# Patient Record
Sex: MALE | Race: ASIAN | Employment: STUDENT | URBAN - METROPOLITAN AREA
[De-identification: names, ages, dates, MRNs, and addresses within clinical notes are randomized per-mention and may not be internally consistent; named-entity substitution may affect disease eponyms.]

---
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
## 2020-01-31 ENCOUNTER — APPOINTMENT (OUTPATIENT)
Dept: CT IMAGING | Age: 20
End: 2020-01-31
Attending: FAMILY MEDICINE
Payer: COMMERCIAL

## 2020-01-31 ENCOUNTER — HOSPITAL ENCOUNTER (OUTPATIENT)
Age: 20
Discharge: HOME | End: 2020-01-31
Attending: FAMILY MEDICINE
Payer: COMMERCIAL

## 2020-01-31 VITALS
HEIGHT: 71 IN | BODY MASS INDEX: 17.5 KG/M2 | SYSTOLIC BLOOD PRESSURE: 99 MMHG | DIASTOLIC BLOOD PRESSURE: 58 MMHG | OXYGEN SATURATION: 98 % | WEIGHT: 125 LBS | HEART RATE: 80 BPM | TEMPERATURE: 98 F | RESPIRATION RATE: 16 BRPM

## 2020-01-31 DIAGNOSIS — R10.9 ABDOMINAL PAIN, ACUTE: Primary | ICD-10-CM

## 2020-01-31 DIAGNOSIS — D72.829 LEUKOCYTOSIS, UNSPECIFIED TYPE: ICD-10-CM

## 2020-01-31 LAB
#MXD IC: 1.1 X10ˆ3/UL (ref 0.1–1)
ALBUMIN SERPL-MCNC: 4.5 G/DL (ref 3.4–5)
ALBUMIN/GLOB SERPL: 1 {RATIO} (ref 1–2)
ALP LIVER SERPL-CCNC: 100 U/L (ref 45–117)
ALT SERPL-CCNC: 35 U/L (ref 16–61)
ANION GAP SERPL CALC-SCNC: 6 MMOL/L (ref 0–18)
AST SERPL-CCNC: 87 U/L (ref 15–37)
BILIRUB SERPL-MCNC: 0.7 MG/DL (ref 0.1–2)
BUN BLD-MCNC: 14 MG/DL (ref 7–18)
BUN/CREAT SERPL: 15.2 (ref 10–20)
CALCIUM BLD-MCNC: 9.3 MG/DL (ref 8.5–10.1)
CHLORIDE SERPL-SCNC: 105 MMOL/L (ref 98–112)
CO2 SERPL-SCNC: 28 MMOL/L (ref 21–32)
CREAT BLD-MCNC: 0.7 MG/DL (ref 0.7–1.3)
CREAT BLD-MCNC: 0.92 MG/DL (ref 0.7–1.3)
GLOBULIN PLAS-MCNC: 4.3 G/DL (ref 2.8–4.4)
GLUCOSE BLD-MCNC: 108 MG/DL (ref 70–99)
HCT VFR BLD AUTO: 45.1 % (ref 39–53)
HGB BLD-MCNC: 15.5 G/DL (ref 13–17.5)
LYMPHOCYTES # BLD AUTO: 1.8 X10ˆ3/UL (ref 1.5–5)
LYMPHOCYTES NFR BLD AUTO: 12.3 %
M PROTEIN MFR SERPL ELPH: 8.8 G/DL (ref 6.4–8.2)
MCH RBC QN AUTO: 29.8 PG (ref 26–34)
MCHC RBC AUTO-ENTMCNC: 34.4 G/DL (ref 31–37)
MCV RBC AUTO: 86.7 FL (ref 80–100)
MIXED CELL %: 7.4 %
NEUTROPHILS # BLD AUTO: 12.1 X10ˆ3/UL (ref 1.5–7.7)
NEUTROPHILS NFR BLD AUTO: 80.3 %
OSMOLALITY SERPL CALC.SUM OF ELEC: 289 MOSM/KG (ref 275–295)
PATIENT FASTING Y/N/NP: NO
PLATELET # BLD AUTO: 258 X10ˆ3/UL (ref 150–450)
POCT BILIRUBIN URINE: NEGATIVE
POCT GLUCOSE URINE: NEGATIVE MG/DL
POCT KETONE URINE: 15 MG/DL
POCT LEUKOCYTE ESTERASE URINE: NEGATIVE
POCT NITRITE URINE: NEGATIVE
POCT PH URINE: 6 (ref 5–8)
POCT PROTEIN URINE: NEGATIVE MG/DL
POCT SPECIFIC GRAVITY URINE: 1.03
POCT URINE CLARITY: CLEAR
POCT URINE COLOR: YELLOW
POCT UROBILINOGEN URINE: 0.2 MG/DL
POTASSIUM SERPL-SCNC: 4.1 MMOL/L (ref 3.5–5.1)
RBC # BLD AUTO: 5.2 X10ˆ6/UL (ref 4.3–5.7)
SODIUM SERPL-SCNC: 139 MMOL/L (ref 136–145)
WBC # BLD AUTO: 15 X10ˆ3/UL (ref 4–11)

## 2020-01-31 PROCEDURE — 85025 COMPLETE CBC W/AUTO DIFF WBC: CPT | Performed by: FAMILY MEDICINE

## 2020-01-31 PROCEDURE — 99214 OFFICE O/P EST MOD 30 MIN: CPT

## 2020-01-31 PROCEDURE — 81002 URINALYSIS NONAUTO W/O SCOPE: CPT | Performed by: FAMILY MEDICINE

## 2020-01-31 PROCEDURE — 96360 HYDRATION IV INFUSION INIT: CPT

## 2020-01-31 PROCEDURE — 80053 COMPREHEN METABOLIC PANEL: CPT | Performed by: FAMILY MEDICINE

## 2020-01-31 PROCEDURE — 82565 ASSAY OF CREATININE: CPT

## 2020-01-31 PROCEDURE — 74177 CT ABD & PELVIS W/CONTRAST: CPT | Performed by: FAMILY MEDICINE

## 2020-01-31 PROCEDURE — 83690 ASSAY OF LIPASE: CPT | Performed by: FAMILY MEDICINE

## 2020-01-31 RX ORDER — ONDANSETRON 4 MG/1
4 TABLET, ORALLY DISINTEGRATING ORAL EVERY 4 HOURS PRN
Qty: 10 TABLET | Refills: 0 | Status: SHIPPED | OUTPATIENT
Start: 2020-01-31 | End: 2020-02-07

## 2020-01-31 RX ORDER — SODIUM CHLORIDE 9 MG/ML
1000 INJECTION, SOLUTION INTRAVENOUS ONCE
Status: COMPLETED | OUTPATIENT
Start: 2020-01-31 | End: 2020-01-31

## 2020-01-31 NOTE — ED INITIAL ASSESSMENT (HPI)
Pt with L upper abd pain for a few months, diagnosed with pancreatitis in August; pain worsening since last night; pt with constipation for several months, pt vomited 1 time an hour and a half ago when trying to have a BM; no fever    Mom states she had to

## 2020-02-01 LAB — LIPASE SERPL-CCNC: 179 U/L (ref 73–393)

## 2020-02-01 NOTE — ED NOTES
Final result received today:  CMP  Medication:   zofran  Notes: dx abd, pain /leukocytosis. discharged home.  Sent to dr. Max Nagy for review

## 2020-02-01 NOTE — ED PROVIDER NOTES
Patient Seen in: [facility]      History   Patient presents with:  Abdomen/Flank Pain  Constipation  Nausea/Vomiting/Diarrhea    Stated Complaint: nausea, vomiting, stomach pain and diarrhea 2 days    HPI    55-year-old male wit rest of the abdomen soft nontender no guarding no rigidity.     ED Course     Labs Reviewed   POCT URINALYSIS DIPSTICK - Abnormal; Notable for the following components:       Result Value    Ketone, Urine 15  (*)     Blood, Urine Trace-Intact (*)     All ot calcification. ADRENALS:  No mass or enlargement. AORTA/VASCULAR:  No aneurysm. RETROPERITONEUM:  No mass or adenopathy. BOWEL/MESENTERY:  No visible mass, obstruction, or bowel wall thickening. ABDOMINAL WALL:  No mass or hernia.   URINARY BLADDER:  N Dispersible  Take 1 tablet (4 mg total) by mouth every 4 (four) hours as needed for Nausea., Normal, Disp-10 tablet, R-0

## 2020-02-04 NOTE — ED NOTES
Phone call placed to pt's phone, message left on voice mail asking pt to please return our call. Return phone number provided.   (Comp M)

## 2020-02-06 NOTE — ED NOTES
Patient was informed of CMP results. He verbalizes that he will follow up w/ PCP for recheck of CMP. Denies any questions or concerns.

## 2021-05-12 ENCOUNTER — HOSPITAL ENCOUNTER (OUTPATIENT)
Age: 21
Discharge: HOME | End: 2021-05-12
Attending: EMERGENCY MEDICINE
Payer: COMMERCIAL

## 2021-05-12 ENCOUNTER — APPOINTMENT (OUTPATIENT)
Dept: GENERAL RADIOLOGY | Age: 21
End: 2021-05-12
Attending: PHYSICIAN ASSISTANT
Payer: COMMERCIAL

## 2021-05-12 VITALS
SYSTOLIC BLOOD PRESSURE: 108 MMHG | BODY MASS INDEX: 17.61 KG/M2 | HEART RATE: 87 BPM | HEIGHT: 72 IN | OXYGEN SATURATION: 98 % | WEIGHT: 130 LBS | RESPIRATION RATE: 20 BRPM | DIASTOLIC BLOOD PRESSURE: 64 MMHG | TEMPERATURE: 98 F

## 2021-05-12 DIAGNOSIS — S63.641A SPRAIN OF METACARPOPHALANGEAL (MCP) JOINT OF RIGHT THUMB, INITIAL ENCOUNTER: Primary | ICD-10-CM

## 2021-05-12 PROCEDURE — 73140 X-RAY EXAM OF FINGER(S): CPT | Performed by: PHYSICIAN ASSISTANT

## 2021-05-12 PROCEDURE — 99213 OFFICE O/P EST LOW 20 MIN: CPT

## 2021-05-12 PROCEDURE — 29130 APPL FINGER SPLINT STATIC: CPT

## 2021-05-12 NOTE — ED PROVIDER NOTES
Patient Seen in: [facility]      History   Patient presents with:  Arm or Hand Injury    Stated Complaint: thumb pain,right hand,injury    HPI/Subjective:   HPI    Patient is a 27-year-old male. Right-hand-dominant.   5 days prior to arri 2 VIEWS), RIGHT THUMB (CPT=73140)    Result Date: 5/12/2021  PROCEDURE:  XR FINGER(S) (MIN 2 VIEWS), RIGHT THUMB (CPT=73140)  INDICATIONS:  thumb pain,right hand,injury  COMPARISON:  None. TECHNIQUE:  Three views of the finger were obtained.   PATIENT STAT

## 2022-04-08 ENCOUNTER — OFFICE VISIT (OUTPATIENT)
Dept: FAMILY MEDICINE CLINIC | Facility: CLINIC | Age: 22
End: 2022-04-08
Payer: COMMERCIAL

## 2022-04-08 VITALS — OXYGEN SATURATION: 98 % | TEMPERATURE: 99 F | HEART RATE: 79 BPM

## 2022-04-08 DIAGNOSIS — J01.00 ACUTE NON-RECURRENT MAXILLARY SINUSITIS: Primary | ICD-10-CM

## 2022-04-08 DIAGNOSIS — J40 BRONCHITIS: ICD-10-CM

## 2022-04-08 PROCEDURE — 99213 OFFICE O/P EST LOW 20 MIN: CPT | Performed by: FAMILY MEDICINE

## 2022-04-08 RX ORDER — ALBUTEROL SULFATE 90 UG/1
2 AEROSOL, METERED RESPIRATORY (INHALATION) EVERY 4 HOURS PRN
Qty: 18 G | Refills: 0 | Status: SHIPPED | OUTPATIENT
Start: 2022-04-08

## 2022-04-08 RX ORDER — AMOXICILLIN AND CLAVULANATE POTASSIUM 875; 125 MG/1; MG/1
1 TABLET, FILM COATED ORAL 2 TIMES DAILY
Qty: 20 TABLET | Refills: 0 | Status: SHIPPED | OUTPATIENT
Start: 2022-04-08 | End: 2022-04-18

## 2022-04-08 RX ORDER — PREDNISONE 20 MG/1
TABLET ORAL
Qty: 10 TABLET | Refills: 0 | Status: SHIPPED | OUTPATIENT
Start: 2022-04-08

## 2022-04-08 NOTE — PATIENT INSTRUCTIONS
Take antibiotics with food and plenty of water. Eat yogurt or take probiotic daily. (Yojana Lorenz is a good example of an OTC probiotic)  Make sure to finish the entire antibiotic treatment. Increase fluids and rest.     Take prednisone-- 2 tabs once daily for 5 days. Take with food. While you are on steroids it is preferred that you take Tylenol for pain if needed rather than ibuprofen (Motrin/Ibuprofen) or Aleve. Use inhaler as needed-- 2 puffs every 4-6 hours for coughing or wheezing. Use OTC meds for comfort as needed--  Ibuprofen/Tylenol for fever/pain  Use Benadryl at bedtime to reduce drainage and promote rest.  Zyrtec/Claritin/Allegra in the AM to reduce nasal drainage without sedation. Use saline nasal sprays to reduce congestion and thin secretions. Consider adding flonase for congestion relief and to reduce sinus infection symptoms. Use Delsym for cough. Consider applying julita's vapo-rub or eucayptus oil to chest and feet at bedtime to reduce chest and nasal congestion. Warm tea with honey, cough lozenges, vaporizers/steam etc.    Monitor symptoms and contact the office if no better in 2-3 days.

## 2022-04-11 ENCOUNTER — TELEPHONE (OUTPATIENT)
Dept: FAMILY MEDICINE CLINIC | Facility: CLINIC | Age: 22
End: 2022-04-11

## 2022-04-11 RX ORDER — ALBUTEROL SULFATE 90 UG/1
2 AEROSOL, METERED RESPIRATORY (INHALATION) EVERY 6 HOURS PRN
Qty: 1 EACH | Refills: 0 | Status: SHIPPED | OUTPATIENT
Start: 2022-04-11

## 2022-04-11 NOTE — TELEPHONE ENCOUNTER
Pt and mom state inhaler, at $35.00 was to expensive and pt can't take prednisone because he didn't like the way it felt. Printed new script mom will take it to son's campus to have it filled.

## 2022-07-27 NOTE — CONSULTS
Gastroenterology Initial Consultation    Landen Martinez Patient Status:  Observation    2000 MRN [id number]   [facility] 3NE-A Attending Bayron Crespo MD   Hosp Day # 0 PCP No primary care provider on file.        Reason for [facility] disturbance. Neurological: Denies frequent headaches, recent passing out, recent dizziness, convulsions/seizures.   Cardiovascular: Denies history of heart murmur, leg swelling, chest pain or pressure after eating or when upset, angina, irregular heart rat abdominal skin  Psychiatric: Normal judgement and insight. Normal mood and affect. Data:  Relevant labs, imaging and medications reviewed.      Lab Results   Component Value Date    WBC 8.9 07/14/2019    HGB 12.8 07/14/2019    HCT 37.1 07/14/2019    PLT Humira Counseling:  I discussed with the patient the risks of adalimumab including but not limited to myelosuppression, immunosuppression, autoimmune hepatitis, demyelinating diseases, lymphoma, and serious infections.  The patient understands that monitoring is required including a PPD at baseline and must alert us or the primary physician if symptoms of infection or other concerning signs are noted.

## 2022-08-11 ENCOUNTER — OFFICE VISIT (OUTPATIENT)
Dept: FAMILY MEDICINE CLINIC | Facility: CLINIC | Age: 22
End: 2022-08-11
Payer: COMMERCIAL

## 2022-08-11 VITALS
BODY MASS INDEX: 17.5 KG/M2 | DIASTOLIC BLOOD PRESSURE: 62 MMHG | SYSTOLIC BLOOD PRESSURE: 102 MMHG | HEIGHT: 70.9 IN | TEMPERATURE: 97 F | HEART RATE: 82 BPM | WEIGHT: 125 LBS | OXYGEN SATURATION: 98 % | RESPIRATION RATE: 16 BRPM

## 2022-08-11 DIAGNOSIS — Z00.00 ENCOUNTER FOR ANNUAL PHYSICAL EXAM: Primary | ICD-10-CM

## 2022-08-11 DIAGNOSIS — Z11.1 SCREENING FOR TUBERCULOSIS: ICD-10-CM

## 2022-08-11 LAB — INDURATION (): 0 MM (ref 0–11)

## 2022-08-11 PROCEDURE — 99385 PREV VISIT NEW AGE 18-39: CPT | Performed by: FAMILY MEDICINE

## 2022-08-11 PROCEDURE — 86580 TB INTRADERMAL TEST: CPT | Performed by: FAMILY MEDICINE

## 2022-08-11 PROCEDURE — 3074F SYST BP LT 130 MM HG: CPT | Performed by: FAMILY MEDICINE

## 2022-08-11 PROCEDURE — 3008F BODY MASS INDEX DOCD: CPT | Performed by: FAMILY MEDICINE

## 2022-08-11 PROCEDURE — 3078F DIAST BP <80 MM HG: CPT | Performed by: FAMILY MEDICINE

## 2022-08-13 ENCOUNTER — MED REC SCAN ONLY (OUTPATIENT)
Dept: FAMILY MEDICINE CLINIC | Facility: CLINIC | Age: 22
End: 2022-08-13

## 2022-08-25 ENCOUNTER — TELEPHONE (OUTPATIENT)
Dept: FAMILY MEDICINE CLINIC | Facility: CLINIC | Age: 22
End: 2022-08-25

## 2022-08-25 NOTE — TELEPHONE ENCOUNTER
Patients mom calling stating that form for work that was completed at [address], is missing 2 check marks. Placing on PCP desk. Mom asking if we can upload in [employer] message to patient. She will try to find fax # in mean time.      Dr. Ketty Durand please advise

## 2022-08-26 NOTE — TELEPHONE ENCOUNTER
LVM for mom, form in [address] folder. Cannot send in [place], can only fax or mom/pt can .      Sent copt to scanning

## 2022-11-27 ENCOUNTER — OFFICE VISIT (OUTPATIENT)
Dept: FAMILY MEDICINE CLINIC | Facility: CLINIC | Age: 22
End: 2022-11-27
Payer: COMMERCIAL

## 2022-11-27 VITALS
HEIGHT: 71 IN | BODY MASS INDEX: 18.2 KG/M2 | RESPIRATION RATE: 18 BRPM | OXYGEN SATURATION: 97 % | SYSTOLIC BLOOD PRESSURE: 101 MMHG | HEART RATE: 106 BPM | TEMPERATURE: 100 F | DIASTOLIC BLOOD PRESSURE: 65 MMHG | WEIGHT: 130 LBS

## 2022-11-27 DIAGNOSIS — J02.9 SORE THROAT: ICD-10-CM

## 2022-11-27 DIAGNOSIS — J06.9 VIRAL UPPER RESPIRATORY ILLNESS: ICD-10-CM

## 2022-11-27 DIAGNOSIS — Z20.818 EXPOSURE TO STREP THROAT: Primary | ICD-10-CM

## 2022-11-27 LAB
CONTROL LINE PRESENT WITH A CLEAR BACKGROUND (YES/NO): YES YES/NO
KIT LOT #: NORMAL NUMERIC
STREP GRP A CUL-SCR: NEGATIVE

## 2022-11-27 PROCEDURE — 3074F SYST BP LT 130 MM HG: CPT | Performed by: NURSE PRACTITIONER

## 2022-11-27 PROCEDURE — 87880 STREP A ASSAY W/OPTIC: CPT | Performed by: NURSE PRACTITIONER

## 2022-11-27 PROCEDURE — 87081 CULTURE SCREEN ONLY: CPT | Performed by: NURSE PRACTITIONER

## 2022-11-27 PROCEDURE — 99213 OFFICE O/P EST LOW 20 MIN: CPT | Performed by: NURSE PRACTITIONER

## 2022-11-27 PROCEDURE — 3008F BODY MASS INDEX DOCD: CPT | Performed by: NURSE PRACTITIONER

## 2022-11-27 PROCEDURE — 3078F DIAST BP <80 MM HG: CPT | Performed by: NURSE PRACTITIONER

## 2024-04-18 ENCOUNTER — APPOINTMENT (OUTPATIENT)
Dept: CT IMAGING | Age: 24
End: 2024-04-18
Attending: EMERGENCY MEDICINE
Payer: COMMERCIAL

## 2024-04-18 ENCOUNTER — HOSPITAL ENCOUNTER (OUTPATIENT)
Age: 24
Discharge: HOME | End: 2024-04-18
Attending: EMERGENCY MEDICINE
Payer: COMMERCIAL

## 2024-04-18 VITALS
WEIGHT: 135 LBS | HEART RATE: 91 BPM | OXYGEN SATURATION: 96 % | TEMPERATURE: 97 F | RESPIRATION RATE: 18 BRPM | HEIGHT: 72 IN | BODY MASS INDEX: 18.28 KG/M2 | SYSTOLIC BLOOD PRESSURE: 106 MMHG | DIASTOLIC BLOOD PRESSURE: 67 MMHG

## 2024-04-18 DIAGNOSIS — J32.3 SPHENOID SINUSITIS, UNSPECIFIED CHRONICITY: ICD-10-CM

## 2024-04-18 DIAGNOSIS — R51.9 ACUTE NONINTRACTABLE HEADACHE, UNSPECIFIED HEADACHE TYPE: Primary | ICD-10-CM

## 2024-04-18 PROCEDURE — 99214 OFFICE O/P EST MOD 30 MIN: CPT

## 2024-04-18 PROCEDURE — 70450 CT HEAD/BRAIN W/O DYE: CPT | Performed by: EMERGENCY MEDICINE

## 2024-04-18 RX ORDER — AMOXICILLIN 500 MG/1
500 TABLET, FILM COATED ORAL 3 TIMES DAILY
Qty: 21 TABLET | Refills: 0 | Status: SHIPPED | OUTPATIENT
Start: 2024-04-18 | End: 2024-04-18

## 2024-04-18 RX ORDER — AMOXICILLIN 500 MG/1
500 TABLET, FILM COATED ORAL 3 TIMES DAILY
Qty: 21 TABLET | Refills: 0 | Status: SHIPPED | OUTPATIENT
Start: 2024-04-18 | End: 2024-04-25

## 2024-04-18 RX ORDER — BUTALBITAL, ACETAMINOPHEN AND CAFFEINE 50; 325; 40 MG/1; MG/1; MG/1
1-2 TABLET ORAL EVERY 6 HOURS PRN
Qty: 10 TABLET | Refills: 0 | Status: SHIPPED | OUTPATIENT
Start: 2024-04-18 | End: 2024-04-23

## 2024-04-18 RX ORDER — BUTALBITAL, ACETAMINOPHEN AND CAFFEINE 50; 325; 40 MG/1; MG/1; MG/1
1-2 TABLET ORAL EVERY 6 HOURS PRN
Qty: 10 TABLET | Refills: 0 | Status: SHIPPED | OUTPATIENT
Start: 2024-04-18 | End: 2024-04-18

## 2024-04-18 NOTE — ED PROVIDER NOTES
Patient Seen in: [facility]      History     Chief Complaint   Patient presents with    Headache     Stated Complaint: Headache    Subjective:   HPI    23-year-old male with history of migraines and pancreatitis presents to the immediate care for complaints of a headache.  He states that he had a headache for the last 2 to 3 days.  He states that it is worse when he first wakes up.  He feels as though he has some left-sided sinus pressure.  He denies any visual scotomas.  Denies any light sensitivity or nausea.  He states that he has a history of migraine headaches but this feels different than that.  He has no complaints of any neck pain.  Denies any facial droop or slurred speech.  Denies any focal motor weakness.    Objective:   Past Medical History:    Migraines    Pancreatitis (HCC)              History reviewed. No pertinent surgical history.             Social History     Socioeconomic History    Marital status: Single   Tobacco Use    Smoking status: Never    Smokeless tobacco: Never   Vaping Use    Vaping status: Former   Substance and Sexual Activity    Alcohol use: Not Currently    Drug use: Yes     Types: Cannabis   Social History Narrative    ** Merged History Encounter **                   Review of Systems    Positive for stated complaint: Headache  Other systems are as noted in HPI.  Constitutional and vital signs reviewed.      All other systems reviewed and negative except as noted above.    Physical Exam     ED Triage Vitals [04/18/24 1827]   /67   Pulse 91   Resp 18   Temp 96.6 °F (35.9 °C)   Temp src Temporal   SpO2 96 %   O2 Device None (Room air)       Current:/67   Pulse 91   Temp 96.6 °F (35.9 °C) (Temporal)   Resp 18   Ht 182.9 cm (6')   Wt 61.2 kg   SpO2 96%   BMI 18.31 kg/m²         Physical Exam    General: Alert and oriented. No acute distress.  HEENT: Normocephalic. No evidence of trauma. Extraocular movements are intact.  Cardiovascular exam:  Regular rate and rhythm  Lungs: Clear to auscultation bilaterally.  Abdomen: Soft, nondistended, nontender.  Extremities: No evidence of deformity. No clubbing or cyanosis.  Neuro: No focal deficit is noted.    ED Course   Labs Reviewed - No data to display  As patient's symptoms today are different than his typical migraines, will obtain a CT scan of the brain since he has had persistent headache for the last several days.  CT scan shows no acute intracranial findings on my independent review of the images.  There does appear to be evidence of sinus disease.  Radiologist interpretation was reviewed:        CT BRAIN OR HEAD ([id number]) (Final result)  Result time 04/18/24 19:05:19  Final result by Shelia Proctor MD (04/18/24 19:05:19)                Impression:    CONCLUSION:  No acute intracranial findings.  Acute on chronic sphenoid sinusitis.              Patient will be discharged home with Fioricet for his headache.  He will be given a prescription for amoxicillin for acute sinus infection.  Recommend follow-up with his primary care doctor.       MDM   Patient was screened and evaluated during this visit.   As a treating physician attending to the patient, I determined, within reasonable clinical confidence and prior to discharge, that an emergency medical condition was not or was no longer present.  There was no indication for further evaluation, treatment or admission on an emergency basis.  Comprehensive verbal and written discharge and follow-up instructions were provided to help prevent relapse or worsening.  Patient was instructed to follow-up with her primary care provider for further evaluation and treatment, but to return immediately to the ER for worsening, concerning, new, changing or persisting symptoms.  I discussed the case with the patient and they had no questions, complaints, or concerns.  Patient felt comfortable going home.    ^^Please note that this report has been produced using speech  recognition software and may contain errors related to that system including, but not limited to, errors in grammar, punctuation, and spelling, as well as words and phrases that possibly may have been recognized inappropriately.  If there are any questions or concerns, contact the dictating provider for clarification                                   Medical Decision Making      Disposition and Plan     Clinical Impression:  1. Acute nonintractable headache, unspecified headache type    2. Sphenoid sinusitis, unspecified chronicity         Disposition:  Discharge  4/18/2024  7:08 pm    Follow-up:  Stephen Mccracken MD  [address] [place] [address]  [phone]    Call   As needed, If symptoms worsen          Medications Prescribed:  Current Discharge Medication List        START taking these medications    Details   butalbital-acetaminophen-caffeine -40 MG Oral Tab Take 1-2 tablets by mouth every 6 (six) hours as needed for Pain.  Qty: 10 tablet, Refills: 0    Associated Diagnoses: Acute nonintractable headache, unspecified headache type

## 2024-04-18 NOTE — ED INITIAL ASSESSMENT (HPI)
Pt began 3 days ago with a headache that gets worse at night and feels like it is coming from his sinus/nose.  Denies any fever , is fatigued, no cough or sore throat at this time

## 2024-04-18 NOTE — ED PROVIDER NOTES
Patient Seen in: [facility]      History     Chief Complaint   Patient presents with    Headache     Stated Complaint: Headache    Subjective:   HPI    23-year-old male with history of migraines and pancreatitis presents to the immediate care for complaints of a headache.  He states that he had a headache for the last 2 to 3 days.  He states that it is worse when he first wakes up.  He feels as though he has some left-sided sinus pressure.  He denies any visual scotomas.  Denies any light sensitivity or nausea.  He states that he has a history of migraine headaches but this feels different than that.  He has no complaints of any neck pain.  Denies any facial droop or slurred speech.  Denies any focal motor weakness.    Objective:   Past Medical History:    Migraines    Pancreatitis (HCC)              History reviewed. No pertinent surgical history.             Social History     Socioeconomic History    Marital status: Single   Tobacco Use    Smoking status: Never    Smokeless tobacco: Never   Vaping Use    Vaping status: Former   Substance and Sexual Activity    Alcohol use: Not Currently    Drug use: Yes     Types: Cannabis   Social History Narrative    ** Merged History Encounter **                   Review of Systems    Positive for stated complaint: Headache  Other systems are as noted in HPI.  Constitutional and vital signs reviewed.      All other systems reviewed and negative except as noted above.    Physical Exam     ED Triage Vitals [04/18/24 1827]   /67   Pulse 91   Resp 18   Temp 96.6 °F (35.9 °C)   Temp src Temporal   SpO2 96 %   O2 Device None (Room air)       Current:/67   Pulse 91   Temp 96.6 °F (35.9 °C) (Temporal)   Resp 18   Ht 182.9 cm (6')   Wt 61.2 kg   SpO2 96%   BMI 18.31 kg/m²         Physical Exam    General: Alert and oriented. No acute distress.  HEENT: Normocephalic. No evidence of trauma. Extraocular movements are intact.  Cardiovascular exam:  Regular rate and rhythm  Lungs: Clear to auscultation bilaterally.  Abdomen: Soft, nondistended, nontender.  Extremities: No evidence of deformity. No clubbing or cyanosis.  Neuro: No focal deficit is noted.    ED Course   Labs Reviewed - No data to display  As patient's symptoms today are different than his typical migraines, will obtain a CT scan of the brain since he has had persistent headache for the last several days.         MDM   Patient was screened and evaluated during this visit.   As a treating physician attending to the patient, I determined, within reasonable clinical confidence and prior to discharge, that an emergency medical condition was not or was no longer present.  There was no indication for further evaluation, treatment or admission on an emergency basis.  Comprehensive verbal and written discharge and follow-up instructions were provided to help prevent relapse or worsening.  Patient was instructed to follow-up with her primary care provider for further evaluation and treatment, but to return immediately to the ER for worsening, concerning, new, changing or persisting symptoms.  I discussed the case with the patient and they had no questions, complaints, or concerns.  Patient felt comfortable going home.    ^^Please note that this report has been produced using speech recognition software and may contain errors related to that system including, but not limited to, errors in grammar, punctuation, and spelling, as well as words and phrases that possibly may have been recognized inappropriately.  If there are any questions or concerns, contact the dictating provider for clarification                                   Medical Decision Making      Disposition and Plan     Clinical Impression:  1. Acute nonintractable headache, unspecified headache type    2. Sphenoid sinusitis, unspecified chronicity         Disposition:  Discharge  4/18/2024  7:08 pm    Follow-up:  Stephen Mccracken MD  [address]  [place]  [address] [place] [id number]  [phone]    Call   As needed, If symptoms worsen          Medications Prescribed:  Current Discharge Medication List        START taking these medications    Details   butalbital-acetaminophen-caffeine -40 MG Oral Tab Take 1-2 tablets by mouth every 6 (six) hours as needed for Pain.  Qty: 10 tablet, Refills: 0    Associated Diagnoses: Acute nonintractable headache, unspecified headache type

## 2024-04-19 NOTE — DISCHARGE INSTRUCTIONS
Follow-up with your primary care doctor as needed  Take Fioricet as needed for headache every 6 hours  Return if any worsening symptoms or any new concerns

## 2024-07-22 ENCOUNTER — HOSPITAL ENCOUNTER (EMERGENCY)
Age: 24
Discharge: HOME | End: 2024-07-22
Attending: EMERGENCY MEDICINE
Payer: COMMERCIAL

## 2024-07-22 ENCOUNTER — APPOINTMENT (OUTPATIENT)
Dept: GENERAL RADIOLOGY | Age: 24
End: 2024-07-22
Attending: EMERGENCY MEDICINE
Payer: COMMERCIAL

## 2024-07-22 VITALS
SYSTOLIC BLOOD PRESSURE: 116 MMHG | TEMPERATURE: 97 F | HEART RATE: 82 BPM | BODY MASS INDEX: 19.6 KG/M2 | WEIGHT: 140 LBS | DIASTOLIC BLOOD PRESSURE: 78 MMHG | RESPIRATION RATE: 18 BRPM | HEIGHT: 71 IN | OXYGEN SATURATION: 98 %

## 2024-07-22 DIAGNOSIS — M54.2 MUSCULOSKELETAL NECK PAIN: Primary | ICD-10-CM

## 2024-07-22 PROCEDURE — 72040 X-RAY EXAM NECK SPINE 2-3 VW: CPT | Performed by: EMERGENCY MEDICINE

## 2024-07-22 PROCEDURE — 99284 EMERGENCY DEPT VISIT MOD MDM: CPT

## 2024-07-22 PROCEDURE — 70360 X-RAY EXAM OF NECK: CPT | Performed by: EMERGENCY MEDICINE

## 2024-07-22 PROCEDURE — 96372 THER/PROPH/DIAG INJ SC/IM: CPT

## 2024-07-22 RX ORDER — DIAZEPAM 5 MG/1
5 TABLET ORAL 3 TIMES DAILY PRN
Qty: 10 TABLET | Refills: 0 | Status: SHIPPED | OUTPATIENT
Start: 2024-07-22 | End: 2024-07-29

## 2024-07-22 RX ORDER — DIAZEPAM 5 MG/1
5 TABLET ORAL ONCE
Status: COMPLETED | OUTPATIENT
Start: 2024-07-22 | End: 2024-07-22

## 2024-07-22 RX ORDER — KETOROLAC TROMETHAMINE 30 MG/ML
30 INJECTION, SOLUTION INTRAMUSCULAR; INTRAVENOUS ONCE
Status: COMPLETED | OUTPATIENT
Start: 2024-07-22 | End: 2024-07-22

## 2024-07-22 RX ORDER — LIDOCAINE 50 MG/G
1 PATCH TOPICAL EVERY 24 HOURS
Qty: 14 PATCH | Refills: 0 | Status: SHIPPED | OUTPATIENT
Start: 2024-07-22 | End: 2024-08-05

## 2024-07-23 NOTE — ED PROVIDER NOTES
Patient Seen in: [facility] [place]      History     Chief Complaint   Patient presents with    Neck Pain     Stated Complaint: sharp pain to neck into back of head since this am. patient unable to move neck*    Subjective:   HPI    23-year-old male past medical history of migraine headaches and pancreatitis presents ED with complaints of pain to the left side of his neck.  Reports that he woke up in the morning with and is progressively becoming worse.  Has taken Aleve as well as acetaminophen without improvement.  Last dose of acetaminophen was 500 mg 1 tab p.o. 4 hours prior to arrival.  Reports that did not help his pain at all denies any tingling numbness or weakness in his extremities.  Denies any trauma or neck injuries recently or in the past.  Pain is worse with movements causes sharp pain in the left side of his neck.    Objective:   Past Medical History:    Migraines    Pancreatitis (HCC)              History reviewed. No pertinent surgical history.             Social History     Socioeconomic History    Marital status: Single   Tobacco Use    Smoking status: Never    Smokeless tobacco: Never   Vaping Use    Vaping status: Former   Substance and Sexual Activity    Alcohol use: Not Currently    Drug use: Yes     Types: Cannabis   Social History Narrative    ** Merged History Encounter **                   Review of Systems    Positive for stated Chief Complaint: Neck Pain    Other systems are as noted in HPI.  Constitutional and vital signs reviewed.      All other systems reviewed and negative except as noted above.    Physical Exam     ED Triage Vitals [07/22/24 2213]   /78   Pulse 82   Resp 18   Temp 97.2 °F (36.2 °C)   Temp src Oral   SpO2 98 %   O2 Device None (Room air)       Current Vitals:   Vital Signs  BP: 116/78  Pulse: 82  Resp: 18  Temp: 97.2 °F (36.2 °C)  Temp src: Oral    Oxygen Therapy  SpO2: 98 %  O2 Device: None (Room air)            Physical Exam  Vitals  and nursing note reviewed.   Constitutional:       Appearance: He is well-developed.   HENT:      Head: Normocephalic and atraumatic.   Eyes:      Pupils: Pupils are equal, round, and reactive to light.   Neck:      Comments:   The paraspinal musculature of the cervical spine region as well as the mastoid region of the insertion site of the SCM as well as the left trapezius muscle Limited ROM secondary to pain in the left side of his neck normal strength sensation left upper extremity  Cardiovascular:      Rate and Rhythm: Normal rate and regular rhythm.   Pulmonary:      Effort: Pulmonary effort is normal.      Breath sounds: Normal breath sounds.   Abdominal:      General: Bowel sounds are normal.      Palpations: Abdomen is soft.   Musculoskeletal:         General: No deformity.      Cervical back: Normal range of motion and neck supple.      Comments: No midline CTL spine tenderness palpation step-offs deformities   Skin:     General: Skin is warm and dry.      Capillary Refill: Capillary refill takes less than 2 seconds.   Neurological:      Mental Status: He is alert and oriented to person, place, and time.   Psychiatric:         Behavior: Behavior normal.               ED Course   Labs Reviewed - No data to display          XR CERVICAL SPINE (2-3 VIEWS) (CPT=72040)    Result Date: 7/22/2024  CONCLUSION:  Unremarkable cervical spine x-ray.   LOCATION:  [facility]   Dictated by (CST): Luis Antonio Wong MD on 7/22/2024 at 11:20 PM     Finalized by (CST): Luis Antonio Wong MD on 7/22/2024 at 11:21 PM       XR SOFT TISSUE NECK (CPT=70360)    Result Date: 7/22/2024  CONCLUSION:  Unremarkable soft tissue neck x-ray.   LOCATION:  [facility]    Dictated by (CST): Luis Antonio Wong MD on 7/22/2024 at 11:18 PM     Finalized by (CST): Luis Antonio Wong MD on 7/22/2024 at 11:20 PM        XR CERVICAL SPINE (2-3 VIEWS) (CPT=72040)    Result Date: 7/22/2024  CONCLUSION:  Unremarkable cervical spine x-ray.   LOCATION:  [facility]   Dictated by (CST):  Luis Antonio Wong MD on 7/22/2024 at 11:20 PM     Finalized by (CST): Luis Antonio Wnog MD on 7/22/2024 at 11:21 PM       XR SOFT TISSUE NECK (CPT=70360)    Result Date: 7/22/2024  CONCLUSION:  Unremarkable soft tissue neck x-ray.   LOCATION:  [facility]    Dictated by (CST): Luis Antonio Wong MD on 7/22/2024 at 11:18 PM     Finalized by (CST): Luis Antonio Wong MD on 7/22/2024 at 11:20 PM             MDM      This is a 24-year-old male who presents ED with complaints left-sided neck pain.  Vital signs stable arrival patient appears nontoxic examination tenderness palpation overlying the left side of his neck paraspinal musculature as well as SCM insertion site at the mastoid region without any overlying edema or erythema as well as left trapezius musculature.  His strength in his left upper extremity is grossly intact as well as sensation to sharp stool is grossly intact.  He is also complaining of pain with swallowing which began about an hour ago.  Will obtain x-rays of his cervical spine rule out cervical spine injury, as well as soft tissue neck rule out retropharyngeal abscess versus epiglottitis.  Patient was given IM injection of ketorolac 30 mg.  He was also given Valium 5 mg p.o. as a muscle relaxer.  X-ray imaging reviewed independently and agree with findings above.   patient reports significant improvement of his pain reports that he is able to rotate his neck further both right and left.  Denies any paresthesias down his extremities.  Reports he feels significantly improved.  Discussed supportive care close follow-up with primary and outpatient PT.  Patient shows understand the plan and is in agreement plan discharged home in stable condition.                                   Medical Decision Making      Disposition and Plan     Clinical Impression:  1. Musculoskeletal neck pain         Disposition:  Discharge  7/22/2024 11:38 pm    Follow-up:  Stephen Mccracken MD  [address] [place] [address]  [id number]  [phone]    Call in 1 day(s)            Medications Prescribed:  Current Discharge Medication List        START taking these medications    Details   lidocaine 5 % External Patch Place 1 patch onto the skin daily for 14 doses.  Qty: 14 patch, Refills: 0    Associated Diagnoses: Musculoskeletal neck pain      diazePAM 5 MG Oral Tab Take 1 tablet (5 mg total) by mouth 3 (three) times daily as needed (muscle pain).  Qty: 10 tablet, Refills: 0    Associated Diagnoses: Musculoskeletal neck pain

## 2024-07-23 NOTE — DISCHARGE INSTRUCTIONS
Please follow-up with your primary care physician 1-2 days return to the ER if your symptoms worsen progress or if you have any further concerns.  Apply heat to the area as well as lidocaine patches as instructed.  Please do the neck stretches as described.  Please alternate acetaminophen 650 mg with ibuprofen 600 mg every 4 hours as needed for pain control.  Take the diazepam as prescribed as needed for muscle relaxer.  Do not take diazepam and drive or operate heavy machinery as it will make you drowsy.  Please discuss physical therapy with your primary care physician.  If you develop tingling numbness or weakness in your extremities return to the ER for further evaluation

## 2024-07-23 NOTE — ED INITIAL ASSESSMENT (HPI)
Pt to ed with complaints of sharp pain to neck into back of head since this am. patient unable to move neck up or down or to right

## (undated) NOTE — ED AVS SNAPSHOT
Sara Tejada   MRN: [id number]    Department:  [facility] Emergency Department   Date of Visit:  8/12/2019           Disclosure     Insurance plans vary and the physician(s) referred by the ER may not be covered by your plan.  Please contact you tell this physician (or your personal doctor if your instructions are to return to your personal doctor) about any new or lasting problems. The primary care or specialist physician will see patients referred from the [facility] Emergency Department.  Rayshawn Navas

## (undated) NOTE — LETTER
04/08/22    Patient Name:  Nicolas Stewart        To Whom it may concern:    Nicolas Stewart was evaluated and treated in the office today for sinusitis and bronchitis as a consequence of suspected cat allergy. He is constantly exposed to cat dander in his current living arrangement. Please consider allowing him to change residence to avoid having to suffer future illness.        Sincerely,     Lenore Mortimer PA-C

## (undated) NOTE — LETTER
[facility] [place]  [address] [address]  Dept: [phone]  Dept Fax: [phone]         May 15, 2018    Patient: Ankur Berrios   YOB: 2000   Date of Visit: 5/15/2018       To Whom It May Concern: